# Patient Record
Sex: FEMALE | Race: WHITE
[De-identification: names, ages, dates, MRNs, and addresses within clinical notes are randomized per-mention and may not be internally consistent; named-entity substitution may affect disease eponyms.]

---

## 2019-02-01 ENCOUNTER — HOSPITAL ENCOUNTER (OUTPATIENT)
Dept: HOSPITAL 61 - KCIC MAMMO | Age: 49
Discharge: HOME | End: 2019-02-01
Attending: FAMILY MEDICINE
Payer: COMMERCIAL

## 2019-02-01 DIAGNOSIS — N63.12: ICD-10-CM

## 2019-02-01 DIAGNOSIS — N63.42: ICD-10-CM

## 2019-02-01 DIAGNOSIS — Z12.31: Primary | ICD-10-CM

## 2019-02-01 PROCEDURE — 77067 SCR MAMMO BI INCL CAD: CPT

## 2019-02-01 PROCEDURE — 77063 BREAST TOMOSYNTHESIS BI: CPT

## 2019-02-01 NOTE — [facility]
Bilateral digital screening mammograms:

 

Reason for examination: Routine baseline screening.

 

Interpretation was made with the benefit of CAD.

 

The skin and nipples show no abnormalities. No abnormal axillary lymph 

nodes are seen. The breast parenchyma is heterogeneously dense. (Breast 

density: Category C.) There are circumscribed nodules bilaterally located 

at approximately the 3:00 position anteriorly in the right breast and in 

the 1:00 B position of the right breast and centrally in the retroareolar 

position of the left breast approximately 8 cm deep to the nipple. 

Recommend further evaluation with ultrasound. There are no suspicious 

calcifications or architectural distortion.

 

Impression:

 

Circumscribed nodules bilaterally. Recommend further evaluation with 

ultrasound.

 

Your patient's mammogram demonstrates that she has dense breast tissue 

(breast density category C or D), which could hide abnormalities, and if 

she has other risk factors for breast cancer that have been identified, 

she might benefit from supplemental screening tests that may be suggested 

by you as her ordering physician. Dense breast tissue, in and of itself, 

is a relatively common condition. Therefore, this information is not 

provided to cause undue concern, but rather to raise your awareness and to

promote discussion with your patient regarding the presence of other risk 

factors, in addition to dense breast tissue. Your patient's mammography 

results will be sent to her.

 

BI-RAD Category 0: Incomplete. Needs additional imaging evaluation.

 

"Our facility is accredited by the [employer] 

Mammography Program."

 

This patient's information has been entered into a reminder system for the

patient to be notified with the results of her examination and a target 

date for the next mammogram.

 

Electronically signed by: Bella Madsen MD (2/1/2019 2:40 PM) [facility]

## 2019-02-11 ENCOUNTER — HOSPITAL ENCOUNTER (OUTPATIENT)
Dept: HOSPITAL 61 - KCIC US | Age: 49
Discharge: HOME | End: 2019-02-11
Attending: FAMILY MEDICINE
Payer: COMMERCIAL

## 2019-02-11 DIAGNOSIS — R92.8: Primary | ICD-10-CM

## 2019-02-11 PROCEDURE — 76641 ULTRASOUND BREAST COMPLETE: CPT

## 2019-02-11 NOTE — [facility]
Bilateral breast ultrasound:

 

Reason for examination: Nodules on baseline screening mammogram.

 

Comparison is made to mammographic exam dated 2/1/2019.

 

Ultrasound examination was performed bilaterally in the areas of 

mammographic concern and at the axilla. 

 

In the right breast, no discrete lesion is seen in the 1:00 position. In 

the 3:00 position 4 cm from the nipple, there is a 6.4 mm septated cystic 

lesion which has a benign appearance. No other cystic or solid lesions are

seen. No abnormal appearing lymph nodes are seen in the right axilla.

 

In the left breast at the 12:30 position 7 cm from the nipple, there is a 

4.4 mm hypoechoic circumscribed lesion and in the adjacent 2 mm hypoechoic

circumscribed lesion with fibrocystic appearances. In the 3:00 position 2 

cm from the nipple, there is a 8.3 mm hypoechoic circumscribed lesion 

consistent with a partially septated cystic lesion. No solid 

suspicious-appearing nodules are seen. No abnormal appearing lymph nodes 

are seen in the left axilla.

 

IMPRESSION:

 

Benign-appearing cystic/fibrocystic lesions bilaterally. No 

suspicious-appearing lesion seen. Recommend 6 month sonographic follow-up.

 

BI-RADS Category 3: Probably benign.

 

"Our facility is accredited by the [employer] 

Mammography Program."

 

This patient's information has been entered into a reminder system for the

patient to be notified with the results of her examination and a target 

date for the next mammogram.

 

Electronically signed by: Bella Madsen MD (2/11/2019 11:06 AM) [facility]

## 2020-02-04 ENCOUNTER — HOSPITAL ENCOUNTER (OUTPATIENT)
Dept: HOSPITAL 61 - KCIC US | Age: 50
Discharge: HOME | End: 2020-02-04
Attending: FAMILY MEDICINE
Payer: COMMERCIAL

## 2020-02-04 DIAGNOSIS — N63.14: Primary | ICD-10-CM

## 2020-02-04 DIAGNOSIS — N63.23: ICD-10-CM

## 2020-02-04 PROCEDURE — 77066 DX MAMMO INCL CAD BI: CPT

## 2020-02-04 PROCEDURE — 76641 ULTRASOUND BREAST COMPLETE: CPT

## 2020-02-04 NOTE — [facility]
Bilateral diagnostic digital mammograms with 3-D tomosynthesis:

 

Reason for examination: Follow-up nodules.

 

Comparison is made to previous study dated 2/1/2019.

 

Bilateral mammograms in CC and oblique projections were obtained with 2-D 

imaging and 3-D tomosynthesis imaging on a Siemens Inspiration unit and 

reviewed on the workstation. Interpretation was made with the benefit of 

CAD.

 

The skin and nipples show no abnormalities. No abnormal axillary lymph 

nodes are seen. The breast parenchyma shows scattered fatty and 

fibroglandular density. (Breast density: Category B.) There continues to 

be a small circumscribed nodule at the 3:00 position anteriorly in the 

right breast. There is also a small nodule seen at the 3:00 B position of 

the left breast. There are no new dominant masses, suspicious 

calcifications or architectural distortion. 

 

Impression:

 

Continued presence of bilateral nodules without change. Ultrasound to 

follow.

 

BI-RAD Category 0: Incomplete. Needs additional imaging evaluation.

 

 

Bilateral breast ultrasound:

 

Comparison is made to previous study dated 2/11/2019.

 

Ultrasound examination of the breast and axilla was performed bilaterally.

 

The right breast continues to show a 4.2 x 7.8 mm septated cystic lesion 

at the 3:00 position 4 cm from the nipple. No other cystic or solid 

nodules are seen. No abnormal appearing lymph nodes are seen in the 

axilla.

 

The left breast continues to show a 1.4 cm cystic-appearing lesion at the 

3:00 position 7 cm from the nipple which shows an interval increase in 

size from 9.7 mm. No other solid or suspicious nodules are seen. No 

abnormal appearing lymph nodes are seen in the axilla.

 

IMPRESSION:

 

Septated cystic lesion in the right breast at the 3:00 position measuring 

7.8 mm in greatest dimension. 1.4 cm cystic lesion in the 3:00 position of

the left breast. Recommend routine mammographic follow-up.

 

BI-RADS Category 2:  Benign.

 

"Our facility is accredited by the [employer] 

Mammography Program."

 

This patient's information has been entered into a reminder system for the

patient to be notified with the results of her examination and a target 

date for the next mammogram.

 

Electronically signed by: Bella Madsen MD (2/4/2020 4:25 PM) [id number]

## 2021-10-14 ENCOUNTER — HOSPITAL ENCOUNTER (EMERGENCY)
Dept: HOSPITAL 61 - ER | Age: 51
Discharge: HOME | End: 2021-10-14
Payer: COMMERCIAL

## 2021-10-14 VITALS — WEIGHT: 178.57 LBS | HEIGHT: 63 IN | BODY MASS INDEX: 31.64 KG/M2

## 2021-10-14 VITALS
DIASTOLIC BLOOD PRESSURE: 84 MMHG | DIASTOLIC BLOOD PRESSURE: 84 MMHG | SYSTOLIC BLOOD PRESSURE: 133 MMHG | SYSTOLIC BLOOD PRESSURE: 133 MMHG

## 2021-10-14 DIAGNOSIS — K04.7: Primary | ICD-10-CM

## 2021-10-14 LAB
ALBUMIN SERPL-MCNC: 3.8 G/DL (ref 3.4–5)
ALBUMIN/GLOB SERPL: 0.8 {RATIO} (ref 1–1.7)
ALP SERPL-CCNC: 103 U/L (ref 46–116)
ALT SERPL-CCNC: 32 U/L (ref 14–59)
ANION GAP SERPL CALC-SCNC: 8 MMOL/L (ref 6–14)
AST SERPL-CCNC: 25 U/L (ref 15–37)
BASOPHILS # BLD AUTO: 0.1 X10^3/UL (ref 0–0.2)
BASOPHILS NFR BLD: 1 % (ref 0–3)
BILIRUB SERPL-MCNC: 0.5 MG/DL (ref 0.2–1)
BUN SERPL-MCNC: 9 MG/DL (ref 7–20)
BUN/CREAT SERPL: 13 (ref 6–20)
CALCIUM SERPL-MCNC: 8.9 MG/DL (ref 8.5–10.1)
CHLORIDE SERPL-SCNC: 99 MMOL/L (ref 98–107)
CO2 SERPL-SCNC: 27 MMOL/L (ref 21–32)
CREAT SERPL-MCNC: 0.7 MG/DL (ref 0.6–1)
CRP SERPL-MCNC: 58.3 MG/L (ref 0–3.3)
EOSINOPHIL NFR BLD: 0.1 X10^3/UL (ref 0–0.7)
EOSINOPHIL NFR BLD: 1 % (ref 0–3)
ERYTHROCYTE [DISTWIDTH] IN BLOOD BY AUTOMATED COUNT: 14 % (ref 11.5–14.5)
GFR SERPLBLD BASED ON 1.73 SQ M-ARVRAT: 87.5 ML/MIN
GLUCOSE SERPL-MCNC: 80 MG/DL (ref 70–99)
HCT VFR BLD CALC: 42.7 % (ref 36–47)
HGB BLD-MCNC: 14.4 G/DL (ref 12–15.5)
LYMPHOCYTES # BLD: 2.4 X10^3/UL (ref 1–4.8)
LYMPHOCYTES NFR BLD AUTO: 17 % (ref 24–48)
MCH RBC QN AUTO: 29 PG (ref 25–35)
MCHC RBC AUTO-ENTMCNC: 34 G/DL (ref 31–37)
MCV RBC AUTO: 86 FL (ref 79–100)
MONO #: 0.7 X10^3/UL (ref 0–1.1)
MONOCYTES NFR BLD: 5 % (ref 0–9)
NEUT #: 10.6 X10^3/UL (ref 1.8–7.7)
NEUTROPHILS NFR BLD AUTO: 76 % (ref 31–73)
PLATELET # BLD AUTO: 336 X10^3/UL (ref 140–400)
POTASSIUM SERPL-SCNC: 3.7 MMOL/L (ref 3.5–5.1)
PROT SERPL-MCNC: 8.5 G/DL (ref 6.4–8.2)
RBC # BLD AUTO: 4.97 X10^6/UL (ref 3.5–5.4)
SODIUM SERPL-SCNC: 134 MMOL/L (ref 136–145)
WBC # BLD AUTO: 13.9 X10^3/UL (ref 4–11)

## 2021-10-14 PROCEDURE — 85651 RBC SED RATE NONAUTOMATED: CPT

## 2021-10-14 PROCEDURE — 96361 HYDRATE IV INFUSION ADD-ON: CPT

## 2021-10-14 PROCEDURE — 84145 PROCALCITONIN (PCT): CPT

## 2021-10-14 PROCEDURE — 85025 COMPLETE CBC W/AUTO DIFF WBC: CPT

## 2021-10-14 PROCEDURE — 36415 COLL VENOUS BLD VENIPUNCTURE: CPT

## 2021-10-14 PROCEDURE — 86140 C-REACTIVE PROTEIN: CPT

## 2021-10-14 PROCEDURE — 96366 THER/PROPH/DIAG IV INF ADDON: CPT

## 2021-10-14 PROCEDURE — 99285 EMERGENCY DEPT VISIT HI MDM: CPT

## 2021-10-14 PROCEDURE — 87040 BLOOD CULTURE FOR BACTERIA: CPT

## 2021-10-14 PROCEDURE — 96367 TX/PROPH/DG ADDL SEQ IV INF: CPT

## 2021-10-14 PROCEDURE — 96365 THER/PROPH/DIAG IV INF INIT: CPT

## 2021-10-14 PROCEDURE — 96375 TX/PRO/DX INJ NEW DRUG ADDON: CPT

## 2021-10-14 PROCEDURE — 83605 ASSAY OF LACTIC ACID: CPT

## 2021-10-14 PROCEDURE — 80053 COMPREHEN METABOLIC PANEL: CPT

## 2021-10-14 PROCEDURE — 70487 CT MAXILLOFACIAL W/DYE: CPT

## 2021-10-14 NOTE — PHYS DOC
Past Medical History


Past Surgical History:  , Hysterectomy


Additional Past Surgical Histo:  "2 BACK SURGERIES."


 (DENVER CRAWFORD APRN)


Smoking Status:  Never Smoker


Alcohol Use:  Occasionally


Social History Narrative:  lives with lissa


 (DENVER CRAWFORD APRN)





General Adult


EDM:


Chief Complaint:  DENTAL PROBLEM





HPI:


HPI:





Patient is a 51  year old female who presents the ED today complaining of left 

lower gum dental abscess that began this morning. Patient states she tried 

seeing a dentist who requested she come to the ED for IV antibiotics and 

drainage. Patient denies any fever or trismus.


 (DENVER CRAWFORD APRN)





Review of Systems:


Review of Systems:


Constitutional:   Denies fever or chills. []


Eyes:   Denies change in visual acuity. []


HENT:   Reports left lower gum dental abscess. Denies nasal congestion or sore 

throat. [] 


Respiratory:   Denies cough or shortness of breath. [] 


Cardiovascular:   Denies chest pain or edema. [] 


GI:   Denies abdominal pain, nausea, vomiting, bloody stools or diarrhea. [] 


:  Denies dysuria. [] 


Musculoskeletal:   Denies back pain or joint pain. [] 


Integument:   Denies rash. [] 


Neurologic:   Denies headache, focal weakness or sensory changes. [] 


Psychiatric:  Denies depression or anxiety. []


 (DENVER CRAWFORD APRN)





Heart Score:


C/O Chest Pain:  N/A


Risk Factors:


Risk Factors:  DM, Current or recent (<one month) smoker, HTN, HLP, family 

history of CAD, obesity.


Risk Scores:


Score 0 - 3:  2.5% MACE over next 6 weeks - Discharge Home


Score 4 - 6:  20.3% MACE over next 6 weeks - Admit for Clinical Observation


Score 7 - 10:  72.7% MACE over next 6 weeks - Early Invasive Strategies


 (DENVER CRAWFORD APRN)





Current Medications:





Current Medications








 Medications


  (Trade)  Dose


 Ordered  Sig/Urban  Start Time


 Stop Time Status Last Admin


Dose Admin


 


 Acetaminophen


  (Tylenol)  650 mg  1X  ONCE  10/14/21 11:00


 10/14/21 11:08 DC  





 


 Morphine Sulfate


  (Morphine


 Sulfate)  4 mg  PRN Q15MIN  PRN  10/14/21 11:00


 10/15/21 10:59   





 


 Piperacillin Sod/


 Tazobactam Sod


 3.375 gm/Sodium


 Chloride  50 ml @ 


 100 mls/hr  1X  ONCE  10/14/21 11:00


 10/14/21 11:29   





 


 Sodium Chloride  1,000 ml @ 


 1,560 mls/hr  Q39M  10/14/21 11:00


 10/14/21 12:00   





 


 Vancomycin HCl


  (Vanco Per


 Pharmacy)  1 each  PRN DAILY  PRN  10/14/21 11:00


     





 


 Vancomycin HCl 2


 gm/Sodium Chloride  500 ml @ 


 250 mls/hr  1X  ONCE  10/14/21 11:15


 10/14/21 13:14   











 (DENVER CRAWFORD APRN)





Allergies:


Allergies:





Allergies








Coded Allergies Type Severity Reaction Last Updated Verified


 


  No Known Drug Allergies    10/14/21 No








 (DENVER CRAWFORD APRN)





Physical Exam:


PE:





Constitutional: Well developed, well nourished, no acute distress, non-toxic 

appearance. []


HENT: Normocephalic, atraumatic, bilateral external ears normal, oropharynx 

moist, no oral exudates, nose normal. []


Left lower cheek with moderate swelling consistent of a dental abscess, no 

swelling structures to the neck. There is warmth over the abscess. Approximately

 tooth #20,21 are broken and decayed, left lower gum is erythematous.


Eyes: PERRLA, EOMI, conjunctiva normal, no discharge. [] 


Neck: Normal range of motion, no tenderness, supple, no stridor. [] 


Cardiovascular:Heart rate regular rhythm, no murmur []


Lungs & Thorax:  Bilateral breath sounds clear to auscultation []


Abdomen: Bowel sounds normal, soft, no tenderness, no masses, no pulsatile 

masses. [] 


Skin: Warm, dry, no erythema, no rash. [] 


Back: No tenderness, no CVA tenderness. [] 


Extremities: No tenderness, no cyanosis, no clubbing, ROM intact, no edema. [] 


Neurologic: Alert and oriented X 3, normal motor function, normal sensory f

unction, no focal deficits noted. []


Psychologic: Affect normal, judgement normal, mood normal. []


 (DENVER CRAWFORD APRN)





Current Patient Data:


Vital Signs:





                                   Vital Signs








  Date Time  Temp Pulse Resp B/P (MAP) Pulse Ox O2 Delivery O2 Flow Rate FiO2


 


10/14/21 10:43 98.4 103 16 181/98 (125) 98 Room Air  





 98.4       








 (JUAN PABLOAKILADENVER CHASE APRHERBERT)





EKG:


EKG:


[]


 (DENVER CRAWFORD)





Radiology/Procedures:


Radiology/Procedures:


[]PROCEDURE: CT MAXILLOFACIAL W/CONTRAST





PQRS Compliance Statement:





One or more of the following individualized dose reduction techniques were 

utilized for this examination:  


1. Automated exposure control  


2. Adjustment of the mA and/or kV according to patient size  


3. Use of iterative reconstruction technique





CT MAXILLOFACIAL WITH IV CONTRAST 10/14/2021 12:35 PM





Indication: Dental abscess





COMPARISON: None available





TECHNIQUE: Multiple axial CT images of the maxillofacial structures were 

obtained after the intravenous administration of nonionic contrast. Coronal and 

sagittal reformats are provided.





FINDINGS:





There is periapical lucency involving the left first mandibular molar as well as

 an unerupted left maxillary molar. There is no cortical destruction although 

there is subtle remodeling of the inner table of the left mandibular cortex. 

There is adjacent edema without a drainable abscess. There is swelling of the 

left alveolar and buccal gingiva. There is thickening of the left platysma with 

subcutaneous fat stranding. Floor of mouth and sublingual space appear normal. 

Nonenlarged left submandibular lymph node measures 0.7 cm with normal fatty 

hilum, likely reactive.





Nasopharynx, oropharynx and hypopharynx are normal. Retropharyngeal course of 

the right internal carotid artery. Thyroid gland is normal in appearance. 

Trachea and larynx are intact. Parotid glands are normal in appearance. Skull 

base is normal. Orbits are normal in appearance. Paranasal sinuses are well 

aerated.





IMPRESSION:





Periapical lucency involving the left maxillary molar and unerupted molar favor 

periapical abscess. There is associated adjacent inflammatory change involving 

the gingiva and subcutaneous soft tissues of the left malar soft tissues. No 

drainable abscess identified.





Electronically signed by: Tyrel Daniel MD (10/14/2021 1:25 PM) [id number]














DICTATED and SIGNED BY:     TYREL DANIEL MD


DATE:     10/14/21 [id number]


 (DENVER CRAWFORD APRHERBERT)





Course & Med Decision Making:


Course & Med Decision Making


Pertinent Labs and Imaging studies reviewed. (See chart for details)





This is a 51-year-old female patient presented to the ED today with a dental 

abscess that began this morning.





Vitals on arrival to the ED temperature 98.0 heart rate 103 blood pressure 

181/98 O2 sats 98% on room air, respirations 16.





CBC with a WBC of 13.9,  CMP with no acute findings





Patient was given Zosyn and vancomycin in the ED





Maxillofacial CT noted for dental abscess





1418 spoke with Dr. Lopez oral surgeon, he requested patient to call his office 

right now and they will get her an appointment as soon as possible





 (DENVER CRAWFORD)





[place] Disclaimer:


Dragon Disclaimer:


This electronic medical record was generated, in whole or in part, using a voice

 recognition dictation system.


 (DENVER CRAWFORD)





Departure


Departure


Impression:  


   Primary Impression:  


   Dental abscess


Disposition:   HOME / SELF CARE / HOMELESS


Condition:  STABLE


Referrals:  


FLORA PETERS (PCP)








MARY JO LOPEZ DMD


call his office right now for an appointment


Patient Instructions:  Dental Abscess





Additional Instructions:  


You were evaluated in the emergency room and noted to have a dental abscess.  

Please call the provided oral surgeon right now and he will get you an 

appointment to be seen in the clinic as soon as possible


Scripts


Hydrocodone Bit/Acetaminophen (HYDROCODONE-APAP 5-325  **) 1 Tab Tablet


1 TAB PO PRN Q6HRS PRN for PAIN, #8 TAB 0 Refills


   Prov: DENVER CRAWFORD         10/14/21


Attending Signature


I have participated in the care of this patient and I have reviewed and agree 

with all pertinent clinical information above including history, exam, and 

recommendations.





 (YUNIOR RODRÍGUEZ DO)











DENVER CRAWFORD            Oct 14, 2021 11:25


YUNIOR RODRÍGUEZ DO               Oct 14, 2021 14:57

## 2021-10-14 NOTE — RAD
PQRS Compliance Statement:



One or more of the following individualized dose reduction techniques were utilized for this examinat
ion:  

1. Automated exposure control  

2. Adjustment of the mA and/or kV according to patient size  

3. Use of iterative reconstruction technique



CT MAXILLOFACIAL WITH IV CONTRAST 10/14/2021 12:35 PM



Indication: Dental abscess



COMPARISON: None available



TECHNIQUE: Multiple axial CT images of the maxillofacial structures were obtained after the intraveno
us administration of nonionic contrast. Coronal and sagittal reformats are provided.



FINDINGS:



There is periapical lucency involving the left first mandibular molar as well as an unerupted left ma
xillary molar. There is no cortical destruction although there is subtle remodeling of the inner tabl
e of the left mandibular cortex. There is adjacent edema without a drainable abscess. There is swelli
ng of the left alveolar and buccal gingiva. There is thickening of the left platysma with subcutaneou
s fat stranding. Floor of mouth and sublingual space appear normal. Nonenlarged left submandibular ly
mph node measures 0.7 cm with normal fatty hilum, likely reactive.



Nasopharynx, oropharynx and hypopharynx are normal. Retropharyngeal course of the right internal roper
tid artery. Thyroid gland is normal in appearance. Trachea and larynx are intact. Parotid glands are 
normal in appearance. Skull base is normal. Orbits are normal in appearance. Paranasal sinuses are we
ll aerated.



IMPRESSION:



Periapical lucency involving the left maxillary molar and unerupted molar favor periapical abscess. T
here is associated adjacent inflammatory change involving the gingiva and subcutaneous soft tissues o
f the left malar soft tissues. No drainable abscess identified.



Electronically signed by: Hoda Pruitt MD (10/14/2021 1:25 PM) [email or web address]

## 2022-04-01 ENCOUNTER — HOSPITAL ENCOUNTER (OUTPATIENT)
Dept: HOSPITAL 61 - KCIC MAMMO | Age: 52
End: 2022-04-01
Attending: NURSE PRACTITIONER
Payer: COMMERCIAL

## 2022-04-01 DIAGNOSIS — Z12.31: Primary | ICD-10-CM

## 2022-04-01 PROCEDURE — 77063 BREAST TOMOSYNTHESIS BI: CPT

## 2022-04-01 PROCEDURE — 77067 SCR MAMMO BI INCL CAD: CPT

## 2022-04-01 NOTE — [facility]
Bilateral digital screening mammograms with 3-D tomosynthesis:



Reason for examination: Routine screening.



Comparison is made to previous mammographic and sonographic studies dated back to 2/1/2019.



Bilateral mammograms in CC and oblique projections were obtained with 2-D imaging and 3-D tomosynthes
is imaging on a Siemens Inspiration unit and reviewed on the workstation. Interpretation was made wit
h the benefit of CAD.



The skin and nipples show no abnormalities. No abnormal axillary lymph nodes are seen. The breast par
enchyma shows scattered fatty and fibroglandular density. (Breast density: Category B.) There continu
es to be a small circumscribed nodule at the 3:00 position anteriorly in the right breast which is st
able. There continues be a focal area of parenchymal asymmetry at the 12:00 position posteriorly in t
he left breast seen best on CC view which is stable. There are no new dominant masses, suspicious maximo
cifications or architectural distortion. 



Impression:



No evidence of malignancy. Recommend routine screening.



BI-RAD Category 2: Benign.



"Our facility is accredited by the [employer] Mammography Program."



This patient's information has been entered into a reminder system for the patient to be notified wit
h the results of her examination and a target date for the next mammogram.



Electronically signed by: Bella Madsen MD (4/1/2022 2:06 PM) [email or web address]